# Patient Record
Sex: FEMALE | Race: WHITE | Employment: PART TIME | URBAN - METROPOLITAN AREA
[De-identification: names, ages, dates, MRNs, and addresses within clinical notes are randomized per-mention and may not be internally consistent; named-entity substitution may affect disease eponyms.]

---

## 2025-03-24 ENCOUNTER — APPOINTMENT (OUTPATIENT)
Dept: DERMATOLOGY | Facility: CLINIC | Age: 65
End: 2025-03-24
Payer: MEDICARE

## 2025-03-24 DIAGNOSIS — Z12.83 ENCOUNTER FOR SCREENING FOR MALIGNANT NEOPLASM OF SKIN: ICD-10-CM

## 2025-03-24 DIAGNOSIS — L57.0 ACTINIC KERATOSIS: ICD-10-CM

## 2025-03-24 DIAGNOSIS — D48.5 NEOPLASM OF UNCERTAIN BEHAVIOR OF SKIN: Primary | ICD-10-CM

## 2025-03-24 DIAGNOSIS — L30.9 HAND DERMATITIS: ICD-10-CM

## 2025-03-24 DIAGNOSIS — L81.4 LENTIGO: ICD-10-CM

## 2025-03-24 PROCEDURE — 1036F TOBACCO NON-USER: CPT | Performed by: NURSE PRACTITIONER

## 2025-03-24 PROCEDURE — 1160F RVW MEDS BY RX/DR IN RCRD: CPT | Performed by: NURSE PRACTITIONER

## 2025-03-24 PROCEDURE — 11102 TANGNTL BX SKIN SINGLE LES: CPT | Performed by: NURSE PRACTITIONER

## 2025-03-24 PROCEDURE — 17000 DESTRUCT PREMALG LESION: CPT | Performed by: NURSE PRACTITIONER

## 2025-03-24 PROCEDURE — 99203 OFFICE O/P NEW LOW 30 MIN: CPT | Performed by: NURSE PRACTITIONER

## 2025-03-24 PROCEDURE — 1159F MED LIST DOCD IN RCRD: CPT | Performed by: NURSE PRACTITIONER

## 2025-03-24 RX ORDER — PAROXETINE HYDROCHLORIDE 40 MG/1
40 TABLET, FILM COATED ORAL
COMMUNITY
Start: 2025-02-25

## 2025-03-24 RX ORDER — BETAMETHASONE DIPROPIONATE 0.5 MG/G
CREAM TOPICAL 2 TIMES DAILY
Qty: 45 G | Refills: 3 | Status: SHIPPED | OUTPATIENT
Start: 2025-03-24

## 2025-03-24 RX ORDER — BETAMETHASONE DIPROPIONATE 0.5 MG/G
CREAM TOPICAL
COMMUNITY
Start: 2024-03-28

## 2025-03-24 RX ORDER — LEVOTHYROXINE SODIUM 100 UG/1
100 TABLET ORAL DAILY
COMMUNITY
Start: 2025-02-13

## 2025-03-24 RX ORDER — SODIUM FLUORIDE1.1%, POTASSIUM NITRATE 5% 5.8; 57.5 MG/ML; MG/ML
GEL, DENTIFRICE DENTAL
COMMUNITY
Start: 2025-01-08

## 2025-03-24 RX ORDER — CLOBETASOL PROPIONATE 0.5 MG/G
CREAM TOPICAL
Qty: 60 G | Refills: 3 | Status: CANCELLED | OUTPATIENT
Start: 2025-03-24

## 2025-03-24 NOTE — LETTER
March 24, 2025     Brenda Alejandra, APRN-CNP  [address] [facility] [address]  [address] [address]    Patient: Merle Soto   YOB: 1960   Date of Visit: 3/24/2025       Dear Dr. Brenda Alejandra, APRN-CNP:    Thank you for referring Merle Soto to me for evaluation. Below are my notes for this consultation.  If you have questions, please do not hesitate to call me. I look forward to following your patient along with you.       Sincerely,     Susan L Mayne, APRN-CNP      CC: No Recipients  ______________________________________________________________________________________    Subjective    Merle Soto is a 65 y.o. female who presents for the following: Suspicious Skin Lesion (Pt presents for lesions of concern on the nose and the right mid back. No history of skin cancer noted. ) and Eczema (Pt complains of dry itchy hands.  States she thinks she has found a cream that works for her but would like assessed.).     Review of Systems:  No other skin or systemic complaints other than what is documented elsewhere in the note.    The following portions of the chart were reviewed this encounter and updated as appropriate:  Tobacco  Allergies  Meds  Problems  Med Hx  Surg Hx  Fam Hx       Skin Cancer History  No skin cancer on file.    Specialty Problems    None    Past Medical History:  Merle Soto  has no past medical history on file.    Past Surgical History:  Merle Soto  has no past surgical history on file.    Family History:  Patient family history is not on file.    Social History:  Merle Soto  reports that she has never smoked. She has never used smokeless tobacco. No history on file for alcohol use and drug use.    Allergies:  Shellfish containing products    Current Medications / CAM's:    Current Outpatient Medications:   •  betamethasone, augmented, (Diprolene AF) 0.05 % cream, , Disp: , Rfl:   •  levothyroxine (Synthroid, Levoxyl)  100 mcg tablet, Take 1 tablet (100 mcg) by mouth once daily., Disp: , Rfl:   •  PARoxetine (Paxil) 40 mg tablet, Take 1 tablet (40 mg) by mouth once daily in the morning. Take before meals., Disp: , Rfl:   •  sodium fluoride-pot nitrate 1.1-5 % paste, USE TO BRUSH TWICE DAILY, Disp: , Rfl:      Objective  Well appearing patient in no apparent distress; mood and affect are within normal limits.    A focused skin examination was performed. All findings within normal limits unless otherwise noted below.    Assessment/Plan  1. Neoplasm of uncertain behavior of skin  Dorsum of Nose  4mm ulcer with surrounding erythema              Lesion biopsy  Type of biopsy: tangential    Informed consent: discussed and consent obtained    Timeout: patient name, date of birth, surgical site, and procedure verified    Procedure prep:  Patient was prepped and draped  Anesthesia: the lesion was anesthetized in a standard fashion    Anesthetic:  1% lidocaine w/ epinephrine 1-100,000 local infiltration  Instrument used: DermaBlade    Hemostasis achieved with: aluminum chloride    Outcome: patient tolerated procedure well    Post-procedure details: sterile dressing applied and wound care instructions given    Dressing type: petrolatum and bandage      Specimen 1 - Dermatopathology- DERM LAB  Differential Diagnosis: R/O NMSC  Check Margins Yes/No?:    Comments:    Dermpath Lab: Routine Histopathology (formalin-fixed tissue)    -  Discussed differential with patient.   - Given uncertainty of clinical diagnosis, a biopsy is recommended in clinic today.   - The patient expressed understanding, is in agreement with this plan, and wishes to proceed with biopsy.   - Oral and written wound care instructions provided.   - Advised the patient that the office will call within 2 weeks to discuss biopsy results.     2. Encounter for screening for malignant neoplasm of skin  Torso - Posterior (Back)  Scattered benign lesions    - Protective measures,  such as avoiding skin exposure to sunlight during peak sun hours (10 AM to 3 PM), wearing protective clothing, and applying high-SPF sunscreen, are essential for reducing exposure to harmful ultraviolet (UV) light.  - Monthly self-examination of the skin is helpful to detect new lesions or changes in existing lesions.  - Discussed signs and symptoms of sun-related skin cancers.   - Make sure your moles are not signs of skin cancer (melanoma). Remember the ABCDEs of melanoma lesions:  A - Asymmetry: One half of the lesion does not mirror the other half.  B - Border: The borders are irregular or vague (indistinct).  C - Color: More than one color may be noted within the mole.  D - Diameter: Size greater than 6 mm (roughly the size of a pencil eraser) may be concerning.  E - Evolving: Notable changes in the lesion over time are suspicious signs for skin cancer.    3. Lentigo (2)  Chest (Upper Torso, Anterior), Torso - Posterior (Back)  Scattered tan macules in sun-exposed areas.    A solar lentigo (plural, solar lentigines), sometimes called an age spot or liver spot, is a brown macule (small, flat, smooth area of skin) caused by chronic sun or artificial ultraviolet (UV) light exposure. There may be just one lentigo or there may be multiple. This type of lentigo is different from lentigo simplex (discussed separately) because it is caused by exposure to UV light. Solar lentigines are benign, but they do indicate excessive sun exposure, a risk factor for the development of skin cancer.  Lesions are benign, no treatment needed.     4. Hand dermatitis  Bilateral palms and web spaces  Erythematous, scaly patches with a few fissures. Washes with soap/water, no hobbies, no trigger identified.     Hand dermatitis  - This can be caused by irritants, allergies, frequent water exposure, or skin disease. Hand dermatitis may cause itching, swelling, or blisters. Without treatment, the skin may become thick, cracked, or scaly, and  bleed.  - Most of the time, hand dermatitis is caused by coming into contact with something that irritates the skin, such as chemicals, or contact with something that causes allergic reactions in some people, such as latex gloves or poison ivy. Work that requires keeping your hands wet can also irritate the skin of your palms.  - Avoid the environmental trigger that is causing the reaction. Wear gloves for work that involves chemicals or submerging your hands in water.  Plan  - Regular use of moisturizer or petroleum jelly, especially after bathing and hand washing, can reduce irritation.  - Wash hands in cold water, if possible.    - Start clobetasol cream, use as directed.   -  Risks, benefits, side effects, alternatives and options were discussed with patient and the patient voiced understanding.      5. Actinic keratosis  Dorsum of Nose  Erythematous scaly macule    -Discussed nature of diagnosis and treatment options.   -Patient wishes to proceed with Cryotherapy today  -Possible side effects of liquid nitrogen treatment reviewed including formation of blisters, crusting, tenderness, scar, and discoloration which may be permanent.  -Patient advised to return the office for re-evaluation if the treated lesion(s) do not resolve within 4-6 weeks. Patient verbalizes understanding.    Destr of lesion - Dorsum of Nose  Complexity: simple    Destruction method: cryotherapy    Informed consent: discussed and consent obtained    Lesion destroyed using liquid nitrogen: Yes    Region frozen until ice ball extended beyond lesion: Yes    Cryotherapy cycles:  1  Outcome: patient tolerated procedure well with no complications    Post-procedure details: wound care instructions given        Follow up in 12 months for a total body skin exam. Please call me if there are any changes or development of concerning symptoms (lesion/skin condition is changing, bleeding, enlarging, or worsening).

## 2025-03-24 NOTE — LETTER
March 27, 2025     Brenda Alejandra, APRN-CNP  [address] [facility] [address]  [address] [address]    Patient: Merle Soto   YOB: 1960   Date of Visit: 3/24/2025       Dear Dr. Brenda Alejandra, APRN-CNP:    Thank you for referring Merle Soto to me for evaluation. Below are my notes for this consultation.  If you have questions, please do not hesitate to call me. I look forward to following your patient along with you.       Sincerely,     Susan L Mayne, APRN-CNP      CC: No Recipients  ______________________________________________________________________________________    Subjective    Merle Soto is a 65 y.o. female who presents for the following: Suspicious Skin Lesion (Pt presents for lesions of concern on the nose and the right mid back. No history of skin cancer noted. ) and Eczema (Pt complains of dry itchy hands.  States she thinks she has found a cream that works for her but would like assessed.).     Review of Systems:  No other skin or systemic complaints other than what is documented elsewhere in the note.    The following portions of the chart were reviewed this encounter and updated as appropriate:  Tobacco  Allergies  Meds  Problems  Med Hx  Surg Hx  Fam Hx       Skin Cancer History  No skin cancer on file.    Specialty Problems    None    Past Medical History:  Merle Soto  has no past medical history on file.    Past Surgical History:  Merle Soto  has no past surgical history on file.    Family History:  Patient family history is not on file.    Social History:  Merle Soto  reports that she has never smoked. She has never used smokeless tobacco. No history on file for alcohol use and drug use.    Allergies:  Shellfish containing products    Current Medications / CAM's:    Current Outpatient Medications:   •  betamethasone, augmented, (Diprolene AF) 0.05 % cream, , Disp: , Rfl:   •  levothyroxine (Synthroid, Levoxyl)  100 mcg tablet, Take 1 tablet (100 mcg) by mouth once daily., Disp: , Rfl:   •  PARoxetine (Paxil) 40 mg tablet, Take 1 tablet (40 mg) by mouth once daily in the morning. Take before meals., Disp: , Rfl:   •  sodium fluoride-pot nitrate 1.1-5 % paste, USE TO BRUSH TWICE DAILY, Disp: , Rfl:   •  betamethasone dipropionate 0.05 % cream, Apply topically 2 times a day., Disp: 45 g, Rfl: 3     Objective  Well appearing patient in no apparent distress; mood and affect are within normal limits.    A focused skin examination was performed. All findings within normal limits unless otherwise noted below.    Assessment/Plan  1. Neoplasm of uncertain behavior of skin  Dorsum of Nose  4mm ulcer with surrounding erythema              Lesion biopsy  Type of biopsy: tangential    Informed consent: discussed and consent obtained    Timeout: patient name, date of birth, surgical site, and procedure verified    Procedure prep:  Patient was prepped and draped  Anesthesia: the lesion was anesthetized in a standard fashion    Anesthetic:  1% lidocaine w/ epinephrine 1-100,000 local infiltration  Instrument used: DermaBlade    Hemostasis achieved with: aluminum chloride    Outcome: patient tolerated procedure well    Post-procedure details: sterile dressing applied and wound care instructions given    Dressing type: petrolatum and bandage      Staff Communication: Dermatology Local Anesthesia: 1 % Lidocaine / Epinephrine - Amount: 1ml    Specimen 1 - Dermatopathology- DERM LAB  Differential Diagnosis: R/O NMSC  Check Margins Yes/No?:    Comments:    Dermpath Lab: Routine Histopathology (formalin-fixed tissue)    -  Discussed differential with patient.   - Given uncertainty of clinical diagnosis, a biopsy is recommended in clinic today.   - The patient expressed understanding, is in agreement with this plan, and wishes to proceed with biopsy.   - Oral and written wound care instructions provided.   - Advised the patient that the office  will call within 2 weeks to discuss biopsy results.     2. Encounter for screening for malignant neoplasm of skin  Torso - Posterior (Back)  Scattered benign lesions    - Protective measures, such as avoiding skin exposure to sunlight during peak sun hours (10 AM to 3 PM), wearing protective clothing, and applying high-SPF sunscreen, are essential for reducing exposure to harmful ultraviolet (UV) light.  - Monthly self-examination of the skin is helpful to detect new lesions or changes in existing lesions.  - Discussed signs and symptoms of sun-related skin cancers.   - Make sure your moles are not signs of skin cancer (melanoma). Remember the ABCDEs of melanoma lesions:  A - Asymmetry: One half of the lesion does not mirror the other half.  B - Border: The borders are irregular or vague (indistinct).  C - Color: More than one color may be noted within the mole.  D - Diameter: Size greater than 6 mm (roughly the size of a pencil eraser) may be concerning.  E - Evolving: Notable changes in the lesion over time are suspicious signs for skin cancer.    3. Lentigo (2)  Chest (Upper Torso, Anterior), Torso - Posterior (Back)  Scattered tan macules in sun-exposed areas.    A solar lentigo (plural, solar lentigines), sometimes called an age spot or liver spot, is a brown macule (small, flat, smooth area of skin) caused by chronic sun or artificial ultraviolet (UV) light exposure. There may be just one lentigo or there may be multiple. This type of lentigo is different from lentigo simplex (discussed separately) because it is caused by exposure to UV light. Solar lentigines are benign, but they do indicate excessive sun exposure, a risk factor for the development of skin cancer.  Lesions are benign, no treatment needed.     4. Hand dermatitis  Bilateral palms and web spaces  Erythematous, scaly patches with a few fissures. Washes with soap/water, no hobbies, no trigger identified.     Hand dermatitis  - This can be caused  by irritants, allergies, frequent water exposure, or skin disease. Hand dermatitis may cause itching, swelling, or blisters. Without treatment, the skin may become thick, cracked, or scaly, and bleed.  - Most of the time, hand dermatitis is caused by coming into contact with something that irritates the skin, such as chemicals, or contact with something that causes allergic reactions in some people, such as latex gloves or poison ivy. Work that requires keeping your hands wet can also irritate the skin of your palms.  - Avoid the environmental trigger that is causing the reaction. Wear gloves for work that involves chemicals or submerging your hands in water.  Plan  - Regular use of moisturizer or petroleum jelly, especially after bathing and hand washing, can reduce irritation.  - Wash hands in cold water, if possible.    - Start betamethasone cream, use as directed.   -  Risks, benefits, side effects, alternatives and options were discussed with patient and the patient voiced understanding.      Related Medications  betamethasone dipropionate 0.05 % cream  Apply topically 2 times a day.    5. Actinic keratosis  Dorsum of Nose  Erythematous scaly macule    -Discussed nature of diagnosis and treatment options.   -Patient wishes to proceed with Cryotherapy today  -Possible side effects of liquid nitrogen treatment reviewed including formation of blisters, crusting, tenderness, scar, and discoloration which may be permanent.  -Patient advised to return the office for re-evaluation if the treated lesion(s) do not resolve within 4-6 weeks. Patient verbalizes understanding.    Destr of lesion - Dorsum of Nose  Complexity: simple    Destruction method: cryotherapy    Informed consent: discussed and consent obtained    Lesion destroyed using liquid nitrogen: Yes    Region frozen until ice ball extended beyond lesion: Yes    Cryotherapy cycles:  1  Outcome: patient tolerated procedure well with no complications     Post-procedure details: wound care instructions given        Follow up in 12 months for a total body skin exam. Please call me if there are any changes or development of concerning symptoms (lesion/skin condition is changing, bleeding, enlarging, or worsening).

## 2025-03-24 NOTE — PROGRESS NOTES
Subjective     Merle Soto is a 65 y.o. female who presents for the following: Suspicious Skin Lesion (Pt presents for lesions of concern on the nose and the right mid back. No history of skin cancer noted. ) and Eczema (Pt complains of dry itchy hands.  States she thinks she has found a cream that works for her but would like assessed.).     Review of Systems:  No other skin or systemic complaints other than what is documented elsewhere in the note.    The following portions of the chart were reviewed this encounter and updated as appropriate:  Tobacco  Allergies  Meds  Problems  Med Hx  Surg Hx  Fam Hx       Skin Cancer History  No skin cancer on file.    Specialty Problems    None    Past Medical History:  Merle Soto  has no past medical history on file.    Past Surgical History:  Merle Soto  has no past surgical history on file.    Family History:  Patient family history is not on file.    Social History:  Merle Soto  reports that she has never smoked. She has never used smokeless tobacco. No history on file for alcohol use and drug use.    Allergies:  Shellfish containing products    Current Medications / CAM's:    Current Outpatient Medications:     betamethasone, augmented, (Diprolene AF) 0.05 % cream, , Disp: , Rfl:     levothyroxine (Synthroid, Levoxyl) 100 mcg tablet, Take 1 tablet (100 mcg) by mouth once daily., Disp: , Rfl:     PARoxetine (Paxil) 40 mg tablet, Take 1 tablet (40 mg) by mouth once daily in the morning. Take before meals., Disp: , Rfl:     sodium fluoride-pot nitrate 1.1-5 % paste, USE TO BRUSH TWICE DAILY, Disp: , Rfl:     betamethasone dipropionate 0.05 % cream, Apply topically 2 times a day., Disp: 45 g, Rfl: 3     Objective   Well appearing patient in no apparent distress; mood and affect are within normal limits.    A focused skin examination was performed. All findings within normal limits unless otherwise noted below.    Assessment/Plan   1. Neoplasm of  uncertain behavior of skin  Dorsum of Nose  4mm ulcer with surrounding erythema              Lesion biopsy  Type of biopsy: tangential    Informed consent: discussed and consent obtained    Timeout: patient name, date of birth, surgical site, and procedure verified    Procedure prep:  Patient was prepped and draped  Anesthesia: the lesion was anesthetized in a standard fashion    Anesthetic:  1% lidocaine w/ epinephrine 1-100,000 local infiltration  Instrument used: DermaBlade    Hemostasis achieved with: aluminum chloride    Outcome: patient tolerated procedure well    Post-procedure details: sterile dressing applied and wound care instructions given    Dressing type: petrolatum and bandage      Staff Communication: Dermatology Local Anesthesia: 1 % Lidocaine / Epinephrine - Amount: 1ml    Specimen 1 - Dermatopathology- DERM LAB  Differential Diagnosis: R/O NMSC  Check Margins Yes/No?:    Comments:    Dermpath Lab: Routine Histopathology (formalin-fixed tissue)    -  Discussed differential with patient.   - Given uncertainty of clinical diagnosis, a biopsy is recommended in clinic today.   - The patient expressed understanding, is in agreement with this plan, and wishes to proceed with biopsy.   - Oral and written wound care instructions provided.   - Advised the patient that the office will call within 2 weeks to discuss biopsy results.     2. Encounter for screening for malignant neoplasm of skin  Torso - Posterior (Back)  Scattered benign lesions    - Protective measures, such as avoiding skin exposure to sunlight during peak sun hours (10 AM to 3 PM), wearing protective clothing, and applying high-SPF sunscreen, are essential for reducing exposure to harmful ultraviolet (UV) light.  - Monthly self-examination of the skin is helpful to detect new lesions or changes in existing lesions.  - Discussed signs and symptoms of sun-related skin cancers.   - Make sure your moles are not signs of skin cancer (melanoma).  Remember the ABCDEs of melanoma lesions:  A - Asymmetry: One half of the lesion does not mirror the other half.  B - Border: The borders are irregular or vague (indistinct).  C - Color: More than one color may be noted within the mole.  D - Diameter: Size greater than 6 mm (roughly the size of a pencil eraser) may be concerning.  E - Evolving: Notable changes in the lesion over time are suspicious signs for skin cancer.    3. Lentigo (2)  Chest (Upper Torso, Anterior), Torso - Posterior (Back)  Scattered tan macules in sun-exposed areas.    A solar lentigo (plural, solar lentigines), sometimes called an age spot or liver spot, is a brown macule (small, flat, smooth area of skin) caused by chronic sun or artificial ultraviolet (UV) light exposure. There may be just one lentigo or there may be multiple. This type of lentigo is different from lentigo simplex (discussed separately) because it is caused by exposure to UV light. Solar lentigines are benign, but they do indicate excessive sun exposure, a risk factor for the development of skin cancer.  Lesions are benign, no treatment needed.     4. Hand dermatitis  Bilateral palms and web spaces  Erythematous, scaly patches with a few fissures. Washes with soap/water, no hobbies, no trigger identified.     Hand dermatitis  - This can be caused by irritants, allergies, frequent water exposure, or skin disease. Hand dermatitis may cause itching, swelling, or blisters. Without treatment, the skin may become thick, cracked, or scaly, and bleed.  - Most of the time, hand dermatitis is caused by coming into contact with something that irritates the skin, such as chemicals, or contact with something that causes allergic reactions in some people, such as latex gloves or poison ivy. Work that requires keeping your hands wet can also irritate the skin of your palms.  - Avoid the environmental trigger that is causing the reaction. Wear gloves for work that involves chemicals or  submerging your hands in water.  Plan  - Regular use of moisturizer or petroleum jelly, especially after bathing and hand washing, can reduce irritation.  - Wash hands in cold water, if possible.    - Start betamethasone cream, use as directed.   -  Risks, benefits, side effects, alternatives and options were discussed with patient and the patient voiced understanding.      Related Medications  betamethasone dipropionate 0.05 % cream  Apply topically 2 times a day.    5. Actinic keratosis  Dorsum of Nose  Erythematous scaly macule    -Discussed nature of diagnosis and treatment options.   -Patient wishes to proceed with Cryotherapy today  -Possible side effects of liquid nitrogen treatment reviewed including formation of blisters, crusting, tenderness, scar, and discoloration which may be permanent.  -Patient advised to return the office for re-evaluation if the treated lesion(s) do not resolve within 4-6 weeks. Patient verbalizes understanding.    Destr of lesion - Dorsum of Nose  Complexity: simple    Destruction method: cryotherapy    Informed consent: discussed and consent obtained    Lesion destroyed using liquid nitrogen: Yes    Region frozen until ice ball extended beyond lesion: Yes    Cryotherapy cycles:  1  Outcome: patient tolerated procedure well with no complications    Post-procedure details: wound care instructions given        Follow up in 12 months for a total body skin exam. Please call me if there are any changes or development of concerning symptoms (lesion/skin condition is changing, bleeding, enlarging, or worsening).

## 2025-03-26 LAB
LABORATORY COMMENT REPORT: NORMAL
PATH REPORT.FINAL DX SPEC: NORMAL
PATH REPORT.GROSS SPEC: NORMAL
PATH REPORT.MICROSCOPIC SPEC OTHER STN: NORMAL
PATH REPORT.RELEVANT HX SPEC: NORMAL
PATH REPORT.TOTAL CANCER: NORMAL

## 2025-03-27 DIAGNOSIS — C44.92 SCC (SQUAMOUS CELL CARCINOMA): ICD-10-CM

## 2025-03-27 NOTE — RESULT ENCOUNTER NOTE
Pt was contacted and message was left requesting call back regarding biopsy results.  Call back number left at this time.       David Caballero LPN

## 2025-03-27 NOTE — RESULT ENCOUNTER NOTE
Pt was contacted and notified of malignant results at this time.  Pt is agreeable to treatment plan. No further questions noted. Referral placed at this time. Pt is very anxious about treatment, would like to be scheduled in [place].      David Caballero LPN

## 2025-04-18 ENCOUNTER — APPOINTMENT (OUTPATIENT)
Dept: DERMATOLOGY | Facility: CLINIC | Age: 65
End: 2025-04-18
Payer: MEDICARE

## 2025-04-18 ENCOUNTER — TELEPHONE (OUTPATIENT)
Dept: DERMATOLOGY | Facility: CLINIC | Age: 65
End: 2025-04-18

## 2025-04-18 VITALS — SYSTOLIC BLOOD PRESSURE: 135 MMHG | DIASTOLIC BLOOD PRESSURE: 86 MMHG | HEART RATE: 77 BPM

## 2025-04-18 DIAGNOSIS — D04.39 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: ICD-10-CM

## 2025-04-18 NOTE — PROGRESS NOTES
Mohs Surgery Operative Note    Date of Surgery:  4/18/2025  Surgeon:  Silver Cook MD  Office Location:  [address] [facility]  [address] [address]  Dept: [phone]  Dept Fax: [phone]  Referring Provider: Susan L Mayne, STEFAN-PENELOPE  [address] [address]      Assessment/Plan   Pre-procedure:   Obtained informed consent: written from patient  The surgical site was identified and confirmed with the patient.     Intra-operative:   Audible time out called at : 8:21 AM 04/18/25  by: ARJUN RAMIREZ RN   Verified patient name, birthdate, site, specimen bottle label & requisition.    The planned procedure(s) was again reviewed with the patient. The risks of bleeding, infection, nerve damage and scarring were reviewed. Written authorization was obtained. The patient identity, surgical site, and planned procedure(s) were verified. The provider acted as both surgeon and pathologist.     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  Dorsum of Nose  Mohs surgery    Consent obtained: written    Universal Protocol:  Procedure explained and questions answered to patient or proxy's satisfaction: Yes    Test results available and properly labeled: Yes    Pathology report reviewed: Yes    External notes reviewed: Yes    Photo or diagram used for site identification: Yes    Site/side marked: Yes    Slide independently reviewed by Mohs surgeon: Yes    Immediately prior to procedure a time out was called: Yes    Patient identity confirmed: verbally with patient  Preparation: Patient was prepped and draped in usual sterile fashion      Anticoagulation:  Is the patient taking prescription anticoagulant and/or aspirin prescribed/recommended by a physician? No    Was the anticoagulation regimen changed prior to Mohs? No      Anesthesia:  Anesthesia method: local infiltration  Local anesthetic: lidocaine 1% WITH epi    Procedure Details:  Case ID  Number: [id number]  Biopsy accession number: [id number]  Date of biopsy: 3/24/2025  Frozen section biopsy performed: No    Specimen debulked: No    Pre-Op diagnosis: squamous cell carcinoma  SCC subtype: in situ  Surgical site (from skin exam): Dorsum of Nose  Pre-operative length (cm): 0.6  Pre-operative width (cm): 0.5  Indications for Mohs surgery: anatomic location where tissue conservation is critical    Micrographic Surgery Details:  Post-operative length (cm): 1  Post-operative width (cm): 0.7  Number of Mohs stages: 1    Stage 1     Comments: The patient was brought into the operating room and placed in the procedure chair in the appropriate position.  The area positive by previous biopsy was identified and confirmed with the patient. The area of clinically obvious tumor was debulked using a curette and/or scalpel as needed. An incision was made following the Mohs approach through the skin. The specimen was taken to the lab, divided into 2 piece(s) and appropriately chromacoded and processed.     Tumor features identified on Mohs section: no tumor identified    Depth of defect: subcutaneous fat    Patient tolerance of procedure: tolerated well, no immediate complications    Reconstruction:  Was the defect reconstructed? Yes    Was reconstruction performed by the same Mohs surgeon? Yes    Setting of reconstruction: outpatient office  When was reconstruction performed? same day  Type of reconstruction: linear  Linear reconstruction: complex  Length of linear repair (cm): 2  Subcutaneous Layers (Deep Stitches)   Suture size:  5-0  Suture type:  Vicryl  Stitches:  Buried vertical mattress  Fine/surface layer approximation (top stitches)   Epidermal/Superficial suture size:  5-0  Epidermal/Superficial suture type:  Prolene  Stitches: simple interrupted and simple running    Suture removal (days):  7  Hemostasis achieved with: electrodesiccation  Outcome: patient tolerated procedure well with no complications     Post-procedure details: sterile dressing applied and wound care instructions given    Dressing type: pressure dressing, petrolatum, Telfa pad and Hypafix      Staff Communication: Dermatology Local Anesthesia: Site Location: Dorsum of Nose 1 % Lidocaine / Epinephrine - Amount: 3.2 ml    Complex Linear Repair - Wide Undermining:  Given the location and size of the defect, it was determined that a complex layered linear closure was required to restore normal anatomy and function. The repair was considered complex because extensive undermining was required and performed. The amount of undermining performed was greater than the maximum width of the defect as measured perpendicular to the closure line along at least one entire edge of the defect. Standing cutaneous cones were removed using Burow's triangles. The wound edges were brought into close approximation with buried vertical mattress sutures. The remainder of the wound was then closed with epidermal top sutures.              The final repair measured 2.0 cm    Wound care was discussed, and the patient was given written post-operative wound care instructions.      The patient will follow up with Silver Cook MD in 1 week for suture removal, and will follow up with their primary dermatologist as scheduled.

## 2025-04-18 NOTE — PROGRESS NOTES
Office Visit Note  Date: 4/18/2025  Surgeon:  Silver Cook MD  Office Location:  [address] [address]  Dept: [phone]  Dept Fax: [phone]  Referring Provider: Susan L Mayne, STEFAN-PENELOPE  [address] [place] [address]    Kirstie Soto is a 65 y.o. female who presents for the following: MOHS Surgery (Dorsum of nose)    According to the patient, the lesion has been present for approximately 6 months at the time of diagnosis.  The lesion is not causing symptoms.  The lesion has not been treated previously.    The patient does not have a pacemaker / defibrillator.  The patient does not have a heart valve / joint replacement.    The patient is not on blood thinners.  The patient does not have a history of hepatitis B or C.  The patient does not have a history of HIV.  The patient does not have a history of immunosuppression (e.g. organ transplantation, malignancy, medications)    Review of Systems:  No other skin or systemic complaints other than what is documented elsewhere in the note.    MEDICAL HISTORY: clinically relevant history including significant past medical history, medications and allergies was reviewed and documented in Epic.    Objective   Well appearing patient in no apparent distress; mood and affect are within normal limits.  Vital signs: See record.  Noted on the   Dorsum of Nose  Is a 0.6 x 0.5 cm scar      The patient confirmed the identified site.    Discussion:  The nature of the diagnosis was explained. The lesion is a skin cancer.  It has a risk of local growth and distant spread. The condition is associated with sun exposure.  Warning signs of non-melanoma skin cancer discussed. Patient was instructed to perform monthly self skin examination.  We recommended that the patient have regular full skin exams given an increased risk of subsequent skin cancers. The patient was instructed to use sun  protective behaviors including use of broad spectrum sunscreens and sun protective clothing to reduce risk of skin cancers.      Risks, benefits, side effects of Mohs surgery were discussed with patient and the patient voiced understanding.  It was explained that even though the cure rate of Mohs is very high it is not 100%. Risks of surgery including but not limited to bleeding, infection, numbness, nerve damage, and scar were reviewed.  Discussion included wound care requirements, activity restrictions, likely scar outcome and time to heal.     After Mohs surgery, the defect may need to be repaired surgically and the scar may be longer than the original lesion.  Reconstruction options, risks, and benefits were reviewed including second intention healing, linear repair (4-1 ratio was explained), local flaps, skin grafts, cartilage grafts and interpolation flaps (the need for multiple surgeries was explained). Possible outcomes were reviewed including likely scar appearance, failure of flap survival, infection, bleeding and the need for revision surgery.     The patient has a squamous cell carcinoma in situ. It was reviewed with the patient that the skin cancer will continue to progress without treatment. The pathology was reviewed, the photograph was reviewed, and the referring physician's note was reviewed.    Medical Decision Making - moderate  Column 1 - chronic illness with progression  Column 3 - decision regarding minor surgery with identified risk factors (bleeding, infection, scarring). Moderate risk of morbidity from additional treatment - mohs surgery      Patient elected for Mohs surgery.

## 2025-04-18 NOTE — TELEPHONE ENCOUNTER
Patient called to let us know that she took an Aleve when she got home for the pain as she did not have any Tylenol or Ibuprofen at home, and she would like to know if she can use the Aleve every 4-6 hours instead. I called her back and let her know that it is okay to take the Aleve instead, but she should follow the instructions on the back of the package. She verbalized understanding, and voiced no other questions or concerns.

## 2025-04-22 ENCOUNTER — TELEPHONE (OUTPATIENT)
Dept: DERMATOLOGY | Facility: CLINIC | Age: 65
End: 2025-04-22
Payer: MEDICARE

## 2025-04-22 DIAGNOSIS — S01.20XA: Primary | ICD-10-CM

## 2025-04-22 RX ORDER — MUPIROCIN 20 MG/G
OINTMENT TOPICAL DAILY
Qty: 30 G | Refills: 1 | Status: SHIPPED | OUTPATIENT
Start: 2025-04-22 | End: 2025-05-06

## 2025-04-22 NOTE — TELEPHONE ENCOUNTER
Patient called with questions regarding redness around incision on the nose after Mohs on 4/18 and sent a photo for review.  Dr Cook reviewed the photo; called patient back and let her know the redness was within the limit to be expected but that he would send her mupirocin to apply rather than Vaseline and that we would look at the incision site at her POV on Friday for suture removal. Confirmed pharmacy with patient. Patient verbalized understanding and agreement with plan and has no further questions or concerns at this time.

## 2025-04-23 ASSESSMENT — DERMATOLOGY QUALITY OF LIFE (QOL) ASSESSMENT
WHAT SINGLE SKIN CONDITION LISTED BELOW IS THE PATIENT ANSWERING THE QUALITY-OF-LIFE ASSESSMENT QUESTIONS ABOUT: NONE OF THE ABOVE
RATE HOW BOTHERED YOU ARE BY EFFECTS OF YOUR SKIN PROBLEMS ON YOUR ACTIVITIES (EG, GOING OUT, ACCOMPLISHING WHAT YOU WANT, WORK ACTIVITIES OR YOUR RELATIONSHIPS WITH OTHERS): 5
RATE HOW EMOTIONALLY BOTHERED YOU ARE BY YOUR SKIN PROBLEM (FOR EXAMPLE, WORRY, EMBARRASSMENT, FRUSTRATION): 0 - NEVER BOTHERED
WHAT SINGLE SKIN CONDITION LISTED BELOW IS THE PATIENT ANSWERING THE QUALITY-OF-LIFE ASSESSMENT QUESTIONS ABOUT: NONE OF THE ABOVE
RATE HOW BOTHERED YOU ARE BY SYMPTOMS OF YOUR SKIN PROBLEM (EG, ITCHING, STINGING BURNING, HURTING OR SKIN IRRITATION): 0 - NEVER BOTHERED
RATE HOW EMOTIONALLY BOTHERED YOU ARE BY YOUR SKIN PROBLEM (FOR EXAMPLE, WORRY, EMBARRASSMENT, FRUSTRATION): 0 - NEVER BOTHERED
RATE HOW BOTHERED YOU ARE BY SYMPTOMS OF YOUR SKIN PROBLEM (EG, ITCHING, STINGING BURNING, HURTING OR SKIN IRRITATION): 0 - NEVER BOTHERED
RATE HOW BOTHERED YOU ARE BY EFFECTS OF YOUR SKIN PROBLEMS ON YOUR ACTIVITIES (EG, GOING OUT, ACCOMPLISHING WHAT YOU WANT, WORK ACTIVITIES OR YOUR RELATIONSHIPS WITH OTHERS): 5

## 2025-04-25 ENCOUNTER — APPOINTMENT (OUTPATIENT)
Dept: DERMATOLOGY | Facility: CLINIC | Age: 65
End: 2025-04-25
Payer: MEDICARE

## 2025-04-25 ENCOUNTER — OFFICE VISIT (OUTPATIENT)
Dept: DERMATOLOGY | Facility: CLINIC | Age: 65
End: 2025-04-25
Payer: MEDICARE

## 2025-04-25 DIAGNOSIS — Z51.89 VISIT FOR WOUND CHECK: ICD-10-CM

## 2025-04-25 PROCEDURE — 99024 POSTOP FOLLOW-UP VISIT: CPT | Performed by: STUDENT IN AN ORGANIZED HEALTH CARE EDUCATION/TRAINING PROGRAM

## 2025-04-25 NOTE — PROGRESS NOTES
Office Follow Up Note    Visit Summary  Chief Complaint    1. Complaint Wound check.    Merle Soto is a 65 y.o. female who presents for 1 week follow up after surgery for a squamous cell carcinoma. The patient reports she has noticed increased redness around the surgical wound. However, it is less tender.     Location Operation site location: dorsum of nose    On exam,  Ms. Soto is well-appearing and in no apparent distress. The surgical site appears clean with mild surrounding erythema. No tenderness and no drainage. Sutures remain intact.     Assessment and Plan:  History of skin cancer requiring ongoing monitoring for recurrence and additional lesion development.   The patient was reassured that the wound is healing appropriately. Redness is within normal limits and given lack of tenderness, swelling and drainage, low suspicion for infection. Patient advised to continue wound care with mupirocin for 1 week.   Sutures were removed without complication today.  The dressing was removed, the wound cleaned and a new dressing reapplied.     The patient was advised on the importance of routine skin monitoring including follow up with general dermatology and instructed to call with any further concerns. The patient will return as needed.

## 2025-10-06 ENCOUNTER — APPOINTMENT (OUTPATIENT)
Dept: DERMATOLOGY | Facility: CLINIC | Age: 65
End: 2025-10-06
Payer: MEDICARE

## 2026-04-02 ENCOUNTER — APPOINTMENT (OUTPATIENT)
Dept: DERMATOLOGY | Facility: CLINIC | Age: 66
End: 2026-04-02
Payer: MEDICARE